# Patient Record
Sex: FEMALE | Race: WHITE | Employment: FULL TIME | URBAN - METROPOLITAN AREA
[De-identification: names, ages, dates, MRNs, and addresses within clinical notes are randomized per-mention and may not be internally consistent; named-entity substitution may affect disease eponyms.]

---
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
## 2022-11-19 NOTE — ED PROVIDER NOTES
EMERGENCY DEPARTMENT HISTORY AND PHYSICAL EXAM    Date: 12/25/2019  Patient Name: Candie Hurtado    History of Presenting Illness     Chief Complaint   Patient presents with    Dog Bite     Ambulatory into the ED with c/o a dog bite to her nose. Injury occured today. She was bitten by her own dog, in [facility]. History Provided By: Patient    Chief Complaint: Nose laceration, dog bite   Duration:  Hours  Timing:  Acute  Location: Nose  Quality: Aching  Severity: 6 out of 10  Modifying Factors: None  Associated Symptoms: two nose lacerations, nosebleed      HPI: Candie Hurtado is a 26 y.o. female with a PMH of No significant past medical history who presents with dog bite and laceration to the nose. She did have a nose piercing in her on across both areas which the dog did pull out. She is not sure if she is up-to-date on tetanus within 5 years. Denies any allergies to medications. She denies any high consciousness, loss traumas or any laceration noted previously. Patient dog is up-to-date on immunizations and she is on the dog for greater than 4 years. Mother is on scene and this is never happened previously. Please note for examination and HPI were completed I did introduce myself as the physician assistant. Please note that this dictation was completed with [employer], the computer voice recognition software. Quite often unanticipated grammatical, syntax, homophones, and other interpretive errors are inadvertently transcribed by the computer software. Please disregard these errors. Please excuse any errors that have escaped final proofreading. For further clarification on any chart please contact myself. Thank you.       PCP: None    Current Facility-Administered Medications   Medication Dose Route Frequency Provider Last Rate Last Dose    lidocaine (PF) (XYLOCAINE) 10 mg/mL (1 %) injection 10 mL  10 mL SubCUTAneous ONCE Willian Goldstein PA-C         Current Outpatient Medications   Medication Sig Dispense Refill    amoxicillin-clavulanate (AUGMENTIN) 875-125 mg per tablet Take 1 Tab by mouth two (2) times a day. 14 Tab 0    fluticasone propionate (FLONASE) 50 mcg/actuation nasal spray 2 Sprays by Nasal route daily. 1 Bottle 0       Past History     Past Medical History:  No past medical history on file. Past Surgical History:  No past surgical history on file. Family History:  No family history on file. Social History:  Social History     Tobacco Use    Smoking status: Not on file   Substance Use Topics    Alcohol use: Not on file    Drug use: Not on file       Allergies:  No Known Allergies      Review of Systems   Review of Systems   Skin: Positive for wound. All other systems reviewed and are negative. Physical Exam     Vitals:    12/25/19 1404   BP: 111/53   Pulse: 99   Resp: 18   Temp: 97.9 °F (36.6 °C)   SpO2: 100%   Weight: 59 kg (130 lb)   Height: 5' 5\" (1.651 m)     Physical Exam  Vitals signs and nursing note reviewed. Constitutional:       Appearance: She is well-developed. HENT:      Head: Normocephalic. Nose:      Comments: There is left-sided exposure in the septum. With 2 nasal lacerations appreciated across the ala bilaterally. Left nostril laceration appreciated approximately 3 cm in nature that goes through. This is towards the wound of the nose does not involve any superior cartilage exposure. Right  nose wing laceration approximately 2 cm in nature that does not go completely through. Bilateral subcutaneous tissue is exposed. There is no evidence of any foreign body objects. Septum does have mild blood appreciated left side where patient had nose ring right side does not have any appreciable bleeding on that side. Nontender palpation across nasal bridge. No obvious deviations appreciated. Eyes:      Conjunctiva/sclera: Conjunctivae normal.      Pupils: Pupils are equal, round, and reactive to light.    Neck:      Musculoskeletal: Normal range of motion and neck supple. Thyroid: No thyromegaly. Cardiovascular:      Rate and Rhythm: Normal rate and regular rhythm. Heart sounds: Normal heart sounds. No murmur. Pulmonary:      Effort: Pulmonary effort is normal. No respiratory distress. Breath sounds: Normal breath sounds. No stridor. No wheezing. Abdominal:      General: Bowel sounds are normal.      Palpations: Abdomen is soft. Tenderness: There is no tenderness. Musculoskeletal: Normal range of motion. General: No tenderness. Lymphadenopathy:      Cervical: No cervical adenopathy. Skin:     General: Skin is warm. Neurological:      Mental Status: She is alert and oriented to person, place, and time. Deep Tendon Reflexes: Reflexes are normal and symmetric. Psychiatric:         Judgment: Judgment normal.           Diagnostic Study Results     Labs -   No results found for this or any previous visit (from the past 12 hour(s)). Radiologic Studies -   No orders to display     CT Results  (Last 48 hours)    None        CXR Results  (Last 48 hours)    None            Medical Decision Making   I am the first provider for this patient. I reviewed the vital signs, available nursing notes, past medical history, past surgical history, family history and social history. Vital Signs-Reviewed the patient's vital signs. Records Reviewed: Nursing Notes            Disposition:  dispo for home    DISCHARGE NOTE:       Care plan outlined and precautions discussed. Patient has no new complaints, changes, or physical findings. All medications were reviewed with the patient; will d/c home with instructions to take anti-inflammatories, antibiotics. All of pt's questions and concerns were addressed.  Patient was instructed and agrees to follow up with follow-up in 2 days for wound check and evaluation, call first thing in the morning plastic surgery and or ear nose and throat specialist, return to the emergency department in 5 to 7 days for suture removal., as well as to return to the ED upon further deterioration. Patient is ready to go home. Follow-up Information     Follow up With Specialties Details Why Contact Info    [facility] EMERGENCY DEPT Emergency Medicine In 2 days For wound re-check [address]  [address] [facility]  [phone]    [facility] EMERGENCY DEPT Emergency Medicine  For suture removal [address] [place], [place] [address]  [phone]          Current Discharge Medication List      START taking these medications    Details   amoxicillin-clavulanate (AUGMENTIN) 875-125 mg per tablet Take 1 Tab by mouth two (2) times a day. Qty: 14 Tab, Refills: 0      fluticasone propionate (FLONASE) 50 mcg/actuation nasal spray 2 Sprays by Nasal route daily. Qty: 1 Bottle, Refills: 0             Provider Notes (Medical Decision Making): At this time patient had wound copiously irrigated and was placed on antibiotics and updated with tetanus vaccination. She did sustain procedure without any acute acute complaints. There is some mild bleeding appreciated from the nose structures. I did instruct patient to return for any new or worsening complications of her laceration including pus discharge, swelling. I did advised that I would close wounds however these wounds do have a tendency to potentially get infected with dog bites however according to research it is less likely as facial structures are not completely vascularized. I did give nasal spray as she did have increased nasal congestion and was only instructed to use the spray 1-2 times a day maximal.  Patient's animal is up-to-date on immunizations and declined rabies prophylaxis at this time.   Procedures:  Wound Repair  Date/Time: 12/26/2019 7:32 AM  Performed by: PAPreparation: skin prepped with Betadine  Location details: nose (Left ala nose)  Wound length:2.6 - 7.5 cm    Anesthesia:  Local Anesthetic: lidocaine 1% without epinephrine  Foreign bodies: no foreign bodies  Irrigation solution: saline  Irrigation method: syringe  Debridement: moderate  Wound skin closure material used: 6-0 Prolene. Number of sutures: 8  Approximation: close  Patient tolerance: Patient tolerated the procedure well with no immediate complications  My total time at bedside, performing this procedure was 16-30 minutes. Wound Repair  Date/Time: 12/26/2019 7:34 AM  Location details: nose (right ala nose )  Wound length:2.5 cm or less    Anesthesia:  Local Anesthetic: lidocaine 1% without epinephrine  Wound skin closure material used: 6-0 Prolene   Number of sutures: 3  Technique: simple  Approximation: close  My total time at bedside, performing this procedure was 1-15 minutes. Please note that this dictation was completed with Dragon, computer voice recognition software. Quite often unanticipated grammatical, syntax, homophones, and other interpretive errors are inadvertently transcribed by the computer software. Please disregard these errors. Additionally, please excuse any errors that have escaped final proofreading. Diagnosis     Clinical Impression:   1. Facial laceration, initial encounter    2.  Dog bite, initial encounter intact